# Patient Record
Sex: MALE | Race: BLACK OR AFRICAN AMERICAN | Employment: FULL TIME | URBAN - NONMETROPOLITAN AREA
[De-identification: names, ages, dates, MRNs, and addresses within clinical notes are randomized per-mention and may not be internally consistent; named-entity substitution may affect disease eponyms.]

---

## 2018-10-30 ENCOUNTER — APPOINTMENT (OUTPATIENT)
Dept: CT IMAGING | Age: 48
End: 2018-10-30
Payer: COMMERCIAL

## 2018-10-30 ENCOUNTER — HOSPITAL ENCOUNTER (EMERGENCY)
Age: 48
Discharge: HOME | End: 2018-10-30
Attending: EMERGENCY MEDICINE
Payer: COMMERCIAL

## 2018-10-30 VITALS
DIASTOLIC BLOOD PRESSURE: 82 MMHG | WEIGHT: 247 LBS | OXYGEN SATURATION: 98 % | TEMPERATURE: 97.3 F | SYSTOLIC BLOOD PRESSURE: 128 MMHG | RESPIRATION RATE: 16 BRPM | HEIGHT: 72 IN | HEART RATE: 74 BPM | BODY MASS INDEX: 33.46 KG/M2

## 2018-10-30 DIAGNOSIS — G44.209 ACUTE NON INTRACTABLE TENSION-TYPE HEADACHE: Primary | ICD-10-CM

## 2018-10-30 DIAGNOSIS — I10 HYPERTENSION, UNSPECIFIED TYPE: ICD-10-CM

## 2018-10-30 PROCEDURE — 70450 CT HEAD/BRAIN W/O DYE: CPT

## 2018-10-30 PROCEDURE — 6360000002 HC RX W HCPCS: Performed by: EMERGENCY MEDICINE

## 2018-10-30 PROCEDURE — 6370000000 HC RX 637 (ALT 250 FOR IP): Performed by: EMERGENCY MEDICINE

## 2018-10-30 PROCEDURE — 99284 EMERGENCY DEPT VISIT MOD MDM: CPT

## 2018-10-30 PROCEDURE — 96372 THER/PROPH/DIAG INJ SC/IM: CPT

## 2018-10-30 RX ORDER — LISINOPRIL AND HYDROCHLOROTHIAZIDE 20; 12.5 MG/1; MG/1
1 TABLET ORAL DAILY
COMMUNITY

## 2018-10-30 RX ORDER — DIPHENHYDRAMINE HCL 25 MG
25 TABLET ORAL EVERY 6 HOURS PRN
Status: DISCONTINUED | OUTPATIENT
Start: 2018-10-30 | End: 2018-10-30 | Stop reason: HOSPADM

## 2018-10-30 RX ADMIN — PROCHLORPERAZINE EDISYLATE 10 MG: 5 INJECTION INTRAMUSCULAR; INTRAVENOUS at 11:44

## 2018-10-30 RX ADMIN — DIPHENHYDRAMINE HYDROCHLORIDE 25 MG: 25 CAPSULE ORAL at 11:44

## 2018-10-30 ASSESSMENT — PAIN DESCRIPTION - PAIN TYPE: TYPE: ACUTE PAIN

## 2018-10-30 ASSESSMENT — PAIN SCALES - GENERAL
PAINLEVEL_OUTOF10: 0
PAINLEVEL_OUTOF10: 4

## 2018-10-30 ASSESSMENT — PAIN DESCRIPTION - ORIENTATION: ORIENTATION: LEFT;UPPER

## 2018-10-30 ASSESSMENT — PAIN DESCRIPTION - DESCRIPTORS: DESCRIPTORS: DULL

## 2018-10-30 NOTE — ED PROVIDER NOTES
Triage Chief Complaint:   Headache (Pt arrives ambulatory stating all day yesterday he had headache that lasted all day at [employer], took Tylenol at work with no relief. Pt states went to bed at 2200 and woke at 2230 with intense head pain top of head that went down left side to left eye. Pt took Excedrin and caffeine and ice and had relief to go back to sleep. Pt states this am still have headache but not as bad)    [place]:  Lizzette Tarango is a 51 y.o. male that presents to the ED from urgent care    Yesterday or the patient complains just a dull headache he tried some meds without relief. He went home with the bed workup but is on sleep with the pain on the top presented and 30. He denies any nausea vomiting blurred vision diplopia problems with speech thought cognition numbness tingling or weakness. He went to work his headache is still there currently a 3 on a scale 0-10 he was seen by the urgent care he spoke to the physician Jessy Latham informing the patient's history and wanted to come to the ED to be checked. Patient's blood pressures mildly elevated states is usually controlled but still mildly elevated on lisinopril/hydrochlorothiazide. He has no family history of subarachnoid hemorrhage/aneurysm disorder and any primary relatives. History reviewed. No pertinent past medical history. Past Surgical History:   Procedure Laterality Date    SHOULDER SURGERY      TONSILLECTOMY       Family History   Problem Relation Age of Onset    High Blood Pressure Father      Social History     Social History    Marital status:      Spouse name: N/A    Number of children: N/A    Years of education: N/A     Occupational History    Not on file.      Social History Main Topics    Smoking status: Never Smoker    Smokeless tobacco: Never Used    Alcohol use No    Drug use: No    Sexual activity: Yes     Partners: Female     Other Topics Concern    Not on file     Social History Narrative    No narrative on file Radiographs (if obtained):  [] The following radiograph wasinterpreted by myself in the absence of a radiologist:   [] Radiologist's Report Reviewed:  CT HEAD WO CONTRAST   Preliminary Result   No acute intracranial abnormality. Ventricular asymmetry with deviation of   the septum to the left. This may represent a variant. Ventricular asymmetry   with septal deviation may be associated with headache. EKG (if obtained): (All EKG's are interpreted by myself in the absence of a cardiologist)    Chart review shows recent radiographs:  No results found. MDM:    Patient presents to the ED with a headache. I do not believe the patient is high risk for some SAH are reviewed the data findings CT imaging the delay in CT and the sensitivities. He understands not to perform a lumbar puncture I informed the mom approximately 98% sure within the 6 hour window he is beyond that his CT for picking up blood is anywhere from 50-70%. He does have asymmetrical ventricles with the radiologist states this can occur and the patient's currently asymptomatic his blood pressures down to the 130s. I recommend follow-up with his PCP tomorrow. Decl[facility]    Clinical Impression:  1. Acute non intractable tension-type headache    2. Hypertension, unspecified type      Disposition referral (if applicable):    YOUR FAMILY MD  Schedule an appointment as soon as possible for a visit   If symptoms worsen    Disposition medications (if applicable):  New Prescriptions    No medications on file           Antonio Phillips DO, FACEP      Comment: Please note this report has been produced using speech recognition software and maycontain errors related to that system including errors in grammar, punctuation, and spelling, as well as words and phrases that may be inappropriate. If there are any questions or concerns please feel free to contact thedictating provider for clarification.         Michel Arentt DO  10/30/18 0650